# Patient Record
Sex: FEMALE | Race: WHITE | NOT HISPANIC OR LATINO | URBAN - METROPOLITAN AREA
[De-identification: names, ages, dates, MRNs, and addresses within clinical notes are randomized per-mention and may not be internally consistent; named-entity substitution may affect disease eponyms.]

---

## 2020-07-24 ENCOUNTER — INPATIENT (INPATIENT)
Facility: HOSPITAL | Age: 25
LOS: 5 days | Discharge: HOME | DRG: 386 | End: 2020-07-30
Attending: INTERNAL MEDICINE | Admitting: INTERNAL MEDICINE
Payer: COMMERCIAL

## 2020-07-24 VITALS
HEIGHT: 61 IN | HEART RATE: 124 BPM | OXYGEN SATURATION: 99 % | RESPIRATION RATE: 20 BRPM | DIASTOLIC BLOOD PRESSURE: 89 MMHG | TEMPERATURE: 98 F | SYSTOLIC BLOOD PRESSURE: 120 MMHG | WEIGHT: 119.93 LBS

## 2020-07-24 DIAGNOSIS — K51.018 ULCERATIVE (CHRONIC) PANCOLITIS WITH OTHER COMPLICATION: ICD-10-CM

## 2020-07-24 LAB
ALBUMIN SERPL ELPH-MCNC: 3.2 G/DL — LOW (ref 3.3–5)
ALP SERPL-CCNC: 72 U/L — SIGNIFICANT CHANGE UP (ref 40–120)
ALT FLD-CCNC: 14 U/L — SIGNIFICANT CHANGE UP (ref 10–45)
ANION GAP SERPL CALC-SCNC: 18 MMOL/L — HIGH (ref 5–17)
AST SERPL-CCNC: 8 U/L — LOW (ref 10–40)
BASE EXCESS BLDV CALC-SCNC: -5 MMOL/L — LOW (ref -2–2)
BASOPHILS # BLD AUTO: 0.11 K/UL — SIGNIFICANT CHANGE UP (ref 0–0.2)
BASOPHILS NFR BLD AUTO: 1 % — SIGNIFICANT CHANGE UP (ref 0–2)
BILIRUB SERPL-MCNC: 0.2 MG/DL — SIGNIFICANT CHANGE UP (ref 0.2–1.2)
BUN SERPL-MCNC: 4 MG/DL — LOW (ref 7–23)
C DIFF GDH STL QL: NEGATIVE — SIGNIFICANT CHANGE UP
C DIFF GDH STL QL: SIGNIFICANT CHANGE UP
CA-I SERPL-SCNC: 1.14 MMOL/L — SIGNIFICANT CHANGE UP (ref 1.12–1.3)
CALCIUM SERPL-MCNC: 8.7 MG/DL — SIGNIFICANT CHANGE UP (ref 8.4–10.5)
CHLORIDE BLDV-SCNC: 103 MMOL/L — SIGNIFICANT CHANGE UP (ref 96–108)
CHLORIDE SERPL-SCNC: 98 MMOL/L — SIGNIFICANT CHANGE UP (ref 96–108)
CO2 BLDV-SCNC: 21 MMOL/L — LOW (ref 22–30)
CO2 SERPL-SCNC: 17 MMOL/L — LOW (ref 22–31)
CREAT SERPL-MCNC: 0.89 MG/DL — SIGNIFICANT CHANGE UP (ref 0.5–1.3)
CRP SERPL-MCNC: 11.16 MG/DL — HIGH (ref 0–0.4)
EOSINOPHIL # BLD AUTO: 0.11 K/UL — SIGNIFICANT CHANGE UP (ref 0–0.5)
EOSINOPHIL NFR BLD AUTO: 1 % — SIGNIFICANT CHANGE UP (ref 0–6)
GAS PNL BLDV: 135 MMOL/L — SIGNIFICANT CHANGE UP (ref 135–145)
GAS PNL BLDV: SIGNIFICANT CHANGE UP
GLUCOSE BLDV-MCNC: 92 MG/DL — SIGNIFICANT CHANGE UP (ref 70–99)
GLUCOSE SERPL-MCNC: 95 MG/DL — SIGNIFICANT CHANGE UP (ref 70–99)
HCG SERPL-ACNC: <2 MIU/ML — SIGNIFICANT CHANGE UP
HCO3 BLDV-SCNC: 20 MMOL/L — LOW (ref 21–29)
HCT VFR BLD CALC: 34.1 % — LOW (ref 34.5–45)
HCT VFR BLDA CALC: 36 % — LOW (ref 39–50)
HGB BLD CALC-MCNC: 11.6 G/DL — SIGNIFICANT CHANGE UP (ref 11.5–15.5)
HGB BLD-MCNC: 11 G/DL — LOW (ref 11.5–15.5)
LACTATE BLDV-MCNC: 1.5 MMOL/L — SIGNIFICANT CHANGE UP (ref 0.7–2)
LG PLATELETS BLD QL AUTO: SLIGHT — SIGNIFICANT CHANGE UP
LYMPHOCYTES # BLD AUTO: 0.67 K/UL — LOW (ref 1–3.3)
LYMPHOCYTES # BLD AUTO: 6 % — LOW (ref 13–44)
MANUAL SMEAR VERIFICATION: SIGNIFICANT CHANGE UP
MCHC RBC-ENTMCNC: 28.9 PG — SIGNIFICANT CHANGE UP (ref 27–34)
MCHC RBC-ENTMCNC: 32.3 GM/DL — SIGNIFICANT CHANGE UP (ref 32–36)
MCV RBC AUTO: 89.7 FL — SIGNIFICANT CHANGE UP (ref 80–100)
MONOCYTES # BLD AUTO: 1.01 K/UL — HIGH (ref 0–0.9)
MONOCYTES NFR BLD AUTO: 9 % — SIGNIFICANT CHANGE UP (ref 2–14)
NEUTROPHILS # BLD AUTO: 8.54 K/UL — HIGH (ref 1.8–7.4)
NEUTROPHILS NFR BLD AUTO: 59 % — SIGNIFICANT CHANGE UP (ref 43–77)
NEUTS BAND # BLD: 17 % — HIGH (ref 0–8)
NRBC # BLD: 0 /100 — SIGNIFICANT CHANGE UP (ref 0–0)
PCO2 BLDV: 36 MMHG — SIGNIFICANT CHANGE UP (ref 35–50)
PH BLDV: 7.35 — SIGNIFICANT CHANGE UP (ref 7.35–7.45)
PLAT MORPH BLD: ABNORMAL
PLATELET # BLD AUTO: 592 K/UL — HIGH (ref 150–400)
PO2 BLDV: 30 MMHG — SIGNIFICANT CHANGE UP (ref 25–45)
POTASSIUM BLDV-SCNC: 4.8 MMOL/L — SIGNIFICANT CHANGE UP (ref 3.5–5.3)
POTASSIUM SERPL-MCNC: 3.7 MMOL/L — SIGNIFICANT CHANGE UP (ref 3.5–5.3)
POTASSIUM SERPL-SCNC: 3.7 MMOL/L — SIGNIFICANT CHANGE UP (ref 3.5–5.3)
PROT SERPL-MCNC: 6.7 G/DL — SIGNIFICANT CHANGE UP (ref 6–8.3)
RBC # BLD: 3.8 M/UL — SIGNIFICANT CHANGE UP (ref 3.8–5.2)
RBC # FLD: 12.5 % — SIGNIFICANT CHANGE UP (ref 10.3–14.5)
RBC BLD AUTO: SIGNIFICANT CHANGE UP
SAO2 % BLDV: 46 % — LOW (ref 67–88)
SARS-COV-2 RNA SPEC QL NAA+PROBE: SIGNIFICANT CHANGE UP
SODIUM SERPL-SCNC: 133 MMOL/L — LOW (ref 135–145)
VARIANT LYMPHS # BLD: 7 % — HIGH (ref 0–6)
WBC # BLD: 11.24 K/UL — HIGH (ref 3.8–10.5)
WBC # FLD AUTO: 11.24 K/UL — HIGH (ref 3.8–10.5)

## 2020-07-24 PROCEDURE — 99285 EMERGENCY DEPT VISIT HI MDM: CPT

## 2020-07-24 PROCEDURE — 93010 ELECTROCARDIOGRAM REPORT: CPT

## 2020-07-24 RX ORDER — MORPHINE SULFATE 50 MG/1
2 CAPSULE, EXTENDED RELEASE ORAL ONCE
Refills: 0 | Status: DISCONTINUED | OUTPATIENT
Start: 2020-07-24 | End: 2020-07-24

## 2020-07-24 RX ORDER — SODIUM CHLORIDE 9 MG/ML
1000 INJECTION INTRAMUSCULAR; INTRAVENOUS; SUBCUTANEOUS
Refills: 0 | Status: DISCONTINUED | OUTPATIENT
Start: 2020-07-24 | End: 2020-07-30

## 2020-07-24 RX ORDER — SODIUM CHLORIDE 9 MG/ML
1000 INJECTION, SOLUTION INTRAVENOUS ONCE
Refills: 0 | Status: COMPLETED | OUTPATIENT
Start: 2020-07-24 | End: 2020-07-24

## 2020-07-24 RX ORDER — METRONIDAZOLE 500 MG
TABLET ORAL
Refills: 0 | Status: DISCONTINUED | OUTPATIENT
Start: 2020-07-24 | End: 2020-07-30

## 2020-07-24 RX ORDER — ONDANSETRON 8 MG/1
4 TABLET, FILM COATED ORAL ONCE
Refills: 0 | Status: COMPLETED | OUTPATIENT
Start: 2020-07-24 | End: 2020-07-24

## 2020-07-24 RX ORDER — CIPROFLOXACIN LACTATE 400MG/40ML
400 VIAL (ML) INTRAVENOUS EVERY 12 HOURS
Refills: 0 | Status: DISCONTINUED | OUTPATIENT
Start: 2020-07-24 | End: 2020-07-30

## 2020-07-24 RX ORDER — MORPHINE SULFATE 50 MG/1
2 CAPSULE, EXTENDED RELEASE ORAL EVERY 4 HOURS
Refills: 0 | Status: DISCONTINUED | OUTPATIENT
Start: 2020-07-24 | End: 2020-07-30

## 2020-07-24 RX ORDER — METRONIDAZOLE 500 MG
500 TABLET ORAL EVERY 8 HOURS
Refills: 0 | Status: DISCONTINUED | OUTPATIENT
Start: 2020-07-25 | End: 2020-07-30

## 2020-07-24 RX ORDER — METRONIDAZOLE 500 MG
500 TABLET ORAL ONCE
Refills: 0 | Status: COMPLETED | OUTPATIENT
Start: 2020-07-24 | End: 2020-07-24

## 2020-07-24 RX ADMIN — Medication 40 MILLIGRAM(S): at 21:32

## 2020-07-24 RX ADMIN — MORPHINE SULFATE 2 MILLIGRAM(S): 50 CAPSULE, EXTENDED RELEASE ORAL at 17:17

## 2020-07-24 RX ADMIN — ONDANSETRON 4 MILLIGRAM(S): 8 TABLET, FILM COATED ORAL at 23:26

## 2020-07-24 RX ADMIN — Medication 40 MILLIGRAM(S): at 17:17

## 2020-07-24 RX ADMIN — SODIUM CHLORIDE 4000 MILLILITER(S): 9 INJECTION, SOLUTION INTRAVENOUS at 17:11

## 2020-07-24 RX ADMIN — MORPHINE SULFATE 2 MILLIGRAM(S): 50 CAPSULE, EXTENDED RELEASE ORAL at 21:32

## 2020-07-24 RX ADMIN — Medication 200 MILLIGRAM(S): at 19:51

## 2020-07-24 RX ADMIN — ONDANSETRON 4 MILLIGRAM(S): 8 TABLET, FILM COATED ORAL at 17:17

## 2020-07-24 RX ADMIN — Medication 100 MILLIGRAM(S): at 18:50

## 2020-07-24 RX ADMIN — SODIUM CHLORIDE 1000 MILLILITER(S): 9 INJECTION, SOLUTION INTRAVENOUS at 17:17

## 2020-07-24 RX ADMIN — SODIUM CHLORIDE 75 MILLILITER(S): 9 INJECTION INTRAMUSCULAR; INTRAVENOUS; SUBCUTANEOUS at 21:39

## 2020-07-24 RX ADMIN — MORPHINE SULFATE 2 MILLIGRAM(S): 50 CAPSULE, EXTENDED RELEASE ORAL at 23:01

## 2020-07-24 NOTE — ED PROVIDER NOTE - ATTENDING CONTRIBUTION TO CARE
------------ATTENDING NOTE------------   pt c/o weeks of intermittent moderate/severe abdominal crampy pain, loose bloody stools, colonoscopy today w/ signs UC/IBD, significant dehydration on ED arrival, awaiting labs/response to fluids, no fevers, awaiting GI consult for admission -->  - Alfredo Madrigal MD   ------------------------------------------------ ------------ATTENDING NOTE------------   pt c/o weeks of intermittent moderate/severe abdominal crampy pain, loose bloody stools, colonoscopy today w/ signs UC/IBD, significant dehydration on ED arrival, awaiting labs/response to fluids, no fevers, awaiting GI consult for admission --> improving w/ fluids/meds, empiric abx, eval by GI, admitting for IVF / advance diet, control IBD.  - Alfredo Madrigal MD   ------------------------------------------------

## 2020-07-24 NOTE — CONSULT NOTE ADULT - ASSESSMENT
one month of colitis, ct at [facility] with colitis, (blood and diarrhea) pt with colon today with severe colitis (has report) likely uc.  agree with steorids and abx.  no travel or sick contacts.  has never been to [facility].

## 2020-07-24 NOTE — ED PROVIDER NOTE - CARE PLAN
Principal Discharge DX:	Ulcerative pancolitis with other complication  Secondary Diagnosis:	Dehydration, moderate

## 2020-07-24 NOTE — H&P ADULT - ASSESSMENT
25 yo female with no PMHx p/w diarrhea, abd pain.  Patient reports that she developed fevers and diarrhea 4 months ago.  fevers resolved, but diarrhea has persisted.  A few weeks ago diarrhea became associated with severe abdominal pain.  Went to an OSH and had a CT showing pancolitis.  Patient followed up with GI and had a colonoscopy today.  Colonoscopy showing severe inflammation concerning for UC.  Sent to the ER from GI for IV steroids and further work up.  Denies fevers, CP, SOB, vomiting.    colitis  - seen by GI  - iv steroids  - iv abx  - stool studies  - clears

## 2020-07-24 NOTE — ED PROVIDER NOTE - PHYSICAL EXAMINATION
Gen: AAO x 3, NAD  Skin: No rashes or lesions  HEENT: NC/AT, PERRLA, EOMI, MMM  Resp: unlabored CTAB  Cardiac: tachycardic s1s2, no murmurs, rubs or gallops  GI: ND, +BS, Soft, diffusely ttp worse in the Right and left lower quadrants  Ext: no pedal edema, FROM in all extremities  Neuro: no focal deficits

## 2020-07-24 NOTE — CONSULT NOTE ADULT - SUBJECTIVE AND OBJECTIVE BOX
Patient is a 24y Female     Patient is a 24y old  Female who presents with a chief complaint of     HPI:      PAST MEDICAL & SURGICAL HISTORY:      MEDICATIONS  (STANDING):  ciprofloxacin   IVPB 400 milliGRAM(s) IV Intermittent every 12 hours  metroNIDAZOLE  IVPB          Allergies    No Known Allergies    Intolerances        SOCIAL HISTORY:  Denies ETOh,Smoking,     FAMILY HISTORY:      REVIEW OF SYSTEMS:    CONSTITUTIONAL: No weakness, fevers or chills  EYES/ENT: No visual changes;  No vertigo or throat pain   NECK: No pain or stiffness  RESPIRATORY: No cough, wheezing, hemoptysis; No shortness of breath  CARDIOVASCULAR: No chest pain or palpitations  GASTROINTESTINAL: No abdominal or epigastric pain. No nausea, vomiting, or hematemesis; No diarrhea or constipation. No melena or hematochezia.  GENITOURINARY: No dysuria, frequency or hematuria  NEUROLOGICAL: No numbness or weakness  SKIN: No itching, burning, rashes, or lesions   All other review of systems is negative unless indicated above.    VITAL:  T(C): , Max: 36.7 (07-24-20 @ 15:51)  T(F): , Max: 98.1 (07-24-20 @ 15:51)  HR: 124 (07-24-20 @ 15:51)  BP: 120/89 (07-24-20 @ 15:51)  BP(mean): --  RR: 20 (07-24-20 @ 15:51)  SpO2: 99% (07-24-20 @ 15:51)  Wt(kg): --    I and O's:    Height (cm): 154.94 (07-24 @ 15:51)  Weight (kg): 54.4 (07-24 @ 15:51)  BMI (kg/m2): 22.7 (07-24 @ 15:51)  BSA (m2): 1.52 (07-24 @ 15:51)    PHYSICAL EXAM:    Constitutional: NAD  HEENT: PERRLA,   Neck: No JVD  Respiratory: CTA B/L  Cardiovascular: S1 and S2  Gastrointestinal: BS+, soft, NT/ND  Extremities: No peripheral edema  Neurological: A/O x 3, no focal deficits  Psychiatric: Normal mood, normal affect  : No Pena  Skin: No rashes  Access: Not applicable  Back: No CVA tenderness    LABS:                        11.0   11.24 )-----------( 592      ( 24 Jul 2020 17:28 )             34.1     07-24    133<L>  |  98  |  4<L>  ----------------------------<  95  3.7   |  17<L>  |  0.89    Ca    8.7      24 Jul 2020 17:28  Mg     2.0     07-24    TPro  6.7  /  Alb  3.2<L>  /  TBili  0.2  /  DBili  x   /  AST  8<L>  /  ALT  14  /  AlkPhos  72  07-24          RADIOLOGY & ADDITIONAL STUDIES:

## 2020-07-24 NOTE — ED PROVIDER NOTE - OBJECTIVE STATEMENT
23 yo female with no PMHx p/w diarrhea, abd pain.  Patient reports that she developed fevers and diarrhea 4 months ago.  fevers resolved, but diarrhea has persisted.  A few weeks ago diarrhea became associated with severe abdominal pain.  Went to an OSH and had a CT showing pancolitis.  Patient followed up with GI and had a colonoscopy today.  Colonoscopy showing severe inflammation concerning for UC.  Sent to the ER from GI for IV steroids and further work up.  Denies fevers, CP, SOB, vomiting.   GI: Dr. Tanesha Avitia

## 2020-07-24 NOTE — ED ADULT NURSE REASSESSMENT NOTE - NS ED NURSE REASSESS COMMENT FT1
Received report @ 1900. Pt lying in bed comfortably but c/o of slight abdominal pain, MD made aware. Pt given cipro as per MD order. Pt admitted to hospital and awaiting for transport. VS stable, pt in no acute distress

## 2020-07-24 NOTE — CHART NOTE - NSCHARTNOTEFT_GEN_A_CORE
Notified by Rn pt c/o of abdominal pain, VSS. Pt admitted for colitis concerning for UC. Discussed with Dr. Ayala about pain regimen, will start pt on morphine 2mg q4hrs PRN for severe pain. Will continue to monitor overnight.

## 2020-07-24 NOTE — H&P ADULT - NSHPLABSRESULTS_GEN_ALL_CORE
LABS:                        11.0   11.24 )-----------( 592      ( 24 Jul 2020 17:28 )             34.1     07-24    133<L>  |  98  |  4<L>  ----------------------------<  95  3.7   |  17<L>  |  0.89    Ca    8.7      24 Jul 2020 17:28  Mg     2.0     07-24    TPro  6.7  /  Alb  3.2<L>  /  TBili  0.2  /  DBili  x   /  AST  8<L>  /  ALT  14  /  AlkPhos  72  07-24              RADIOLOGY & ADDITIONAL TESTS:

## 2020-07-24 NOTE — ED ADULT NURSE NOTE - OBJECTIVE STATEMENT
pt pw c/o abd pain assoc with diarrhea with bright red blood and dark red clots onset beginning of April, went to urgent care in June and was Dx with diverticulitis was sent home on abx states took for a week with no relief, so went to [facility]  last weekend and was Dx there with pancolitis and was sent home with outpatient GI follow up pt states she found a specialist who performed colonoscopy today and was told to come straight to the ED due the results of colonoscopy and pt still remains in pain for immediate treatment. had one episode of vomiting on Tuesday, denies any N/V today, denies fevers but states she has had intermittent chills and sweats.

## 2020-07-24 NOTE — H&P ADULT - HISTORY OF PRESENT ILLNESS
23 yo female with no PMHx p/w diarrhea, abd pain.  Patient reports that she developed fevers and diarrhea 4 months ago.  fevers resolved, but diarrhea has persisted.  A few weeks ago diarrhea became associated with severe abdominal pain.  Went to an OSH and had a CT showing pancolitis.  Patient followed up with GI and had a colonoscopy today.  Colonoscopy showing severe inflammation concerning for UC.  Sent to the ER from GI for IV steroids and further work up.  Denies fevers, CP, SOB, vomiting.

## 2020-07-25 LAB
ANION GAP SERPL CALC-SCNC: 13 MMOL/L — SIGNIFICANT CHANGE UP (ref 5–17)
BUN SERPL-MCNC: 5 MG/DL — LOW (ref 7–23)
CALCIUM SERPL-MCNC: 9 MG/DL — SIGNIFICANT CHANGE UP (ref 8.4–10.5)
CHLORIDE SERPL-SCNC: 100 MMOL/L — SIGNIFICANT CHANGE UP (ref 96–108)
CO2 SERPL-SCNC: 22 MMOL/L — SIGNIFICANT CHANGE UP (ref 22–31)
CREAT SERPL-MCNC: 0.73 MG/DL — SIGNIFICANT CHANGE UP (ref 0.5–1.3)
CULTURE RESULTS: SIGNIFICANT CHANGE UP
ERYTHROCYTE [SEDIMENTATION RATE] IN BLOOD: 98 MM/HR — HIGH (ref 0–15)
FOLATE SERPL-MCNC: 11.5 NG/ML — SIGNIFICANT CHANGE UP
GLUCOSE SERPL-MCNC: 180 MG/DL — HIGH (ref 70–99)
HCT VFR BLD CALC: 31.4 % — LOW (ref 34.5–45)
HGB BLD-MCNC: 10.2 G/DL — LOW (ref 11.5–15.5)
MAGNESIUM SERPL-MCNC: 2.1 MG/DL — SIGNIFICANT CHANGE UP (ref 1.6–2.6)
MCHC RBC-ENTMCNC: 29.3 PG — SIGNIFICANT CHANGE UP (ref 27–34)
MCHC RBC-ENTMCNC: 32.5 GM/DL — SIGNIFICANT CHANGE UP (ref 32–36)
MCV RBC AUTO: 90.2 FL — SIGNIFICANT CHANGE UP (ref 80–100)
NRBC # BLD: 0 /100 WBCS — SIGNIFICANT CHANGE UP (ref 0–0)
PHOSPHATE SERPL-MCNC: 3.6 MG/DL — SIGNIFICANT CHANGE UP (ref 2.5–4.5)
PLATELET # BLD AUTO: 567 K/UL — HIGH (ref 150–400)
POTASSIUM SERPL-MCNC: 4.5 MMOL/L — SIGNIFICANT CHANGE UP (ref 3.5–5.3)
POTASSIUM SERPL-SCNC: 4.5 MMOL/L — SIGNIFICANT CHANGE UP (ref 3.5–5.3)
RBC # BLD: 3.48 M/UL — LOW (ref 3.8–5.2)
RBC # FLD: 12.7 % — SIGNIFICANT CHANGE UP (ref 10.3–14.5)
SODIUM SERPL-SCNC: 135 MMOL/L — SIGNIFICANT CHANGE UP (ref 135–145)
SPECIMEN SOURCE: SIGNIFICANT CHANGE UP
TSH SERPL-MCNC: 1.08 UIU/ML — SIGNIFICANT CHANGE UP (ref 0.27–4.2)
TSH SERPL-MCNC: 2.24 UIU/ML — SIGNIFICANT CHANGE UP (ref 0.27–4.2)
VIT B12 SERPL-MCNC: >2000 PG/ML — HIGH (ref 232–1245)
WBC # BLD: 6.9 K/UL — SIGNIFICANT CHANGE UP (ref 3.8–10.5)
WBC # FLD AUTO: 6.9 K/UL — SIGNIFICANT CHANGE UP (ref 3.8–10.5)

## 2020-07-25 RX ADMIN — MORPHINE SULFATE 2 MILLIGRAM(S): 50 CAPSULE, EXTENDED RELEASE ORAL at 21:00

## 2020-07-25 RX ADMIN — MORPHINE SULFATE 2 MILLIGRAM(S): 50 CAPSULE, EXTENDED RELEASE ORAL at 14:56

## 2020-07-25 RX ADMIN — MORPHINE SULFATE 2 MILLIGRAM(S): 50 CAPSULE, EXTENDED RELEASE ORAL at 03:16

## 2020-07-25 RX ADMIN — Medication 100 MILLIGRAM(S): at 07:26

## 2020-07-25 RX ADMIN — Medication 40 MILLIGRAM(S): at 13:07

## 2020-07-25 RX ADMIN — MORPHINE SULFATE 2 MILLIGRAM(S): 50 CAPSULE, EXTENDED RELEASE ORAL at 07:39

## 2020-07-25 RX ADMIN — MORPHINE SULFATE 2 MILLIGRAM(S): 50 CAPSULE, EXTENDED RELEASE ORAL at 03:45

## 2020-07-25 RX ADMIN — Medication 200 MILLIGRAM(S): at 05:03

## 2020-07-25 RX ADMIN — Medication 100 MILLIGRAM(S): at 13:05

## 2020-07-25 RX ADMIN — Medication 100 MILLIGRAM(S): at 22:36

## 2020-07-25 RX ADMIN — MORPHINE SULFATE 2 MILLIGRAM(S): 50 CAPSULE, EXTENDED RELEASE ORAL at 19:59

## 2020-07-25 RX ADMIN — MORPHINE SULFATE 2 MILLIGRAM(S): 50 CAPSULE, EXTENDED RELEASE ORAL at 10:01

## 2020-07-25 RX ADMIN — MORPHINE SULFATE 2 MILLIGRAM(S): 50 CAPSULE, EXTENDED RELEASE ORAL at 14:23

## 2020-07-25 RX ADMIN — Medication 40 MILLIGRAM(S): at 22:35

## 2020-07-25 RX ADMIN — Medication 40 MILLIGRAM(S): at 05:02

## 2020-07-25 RX ADMIN — Medication 200 MILLIGRAM(S): at 17:11

## 2020-07-25 NOTE — PROGRESS NOTE ADULT - SUBJECTIVE AND OBJECTIVE BOX
Patient is a 25y old  Female who presents with a chief complaint of colitis (25 Jul 2020 11:19)      SUBJECTIVE / OVERNIGHT EVENTS:  having diarrhea with blood clots    MEDICATIONS  (STANDING):  ciprofloxacin   IVPB 400 milliGRAM(s) IV Intermittent every 12 hours  methylPREDNISolone sodium succinate Injectable 40 milliGRAM(s) IV Push every 8 hours  metroNIDAZOLE  IVPB      metroNIDAZOLE  IVPB 500 milliGRAM(s) IV Intermittent every 8 hours  sodium chloride 0.9%. 1000 milliLiter(s) (75 mL/Hr) IV Continuous <Continuous>    MEDICATIONS  (PRN):  morphine  - Injectable 2 milliGRAM(s) IV Push every 4 hours PRN Severe Pain (7 - 10)      Vital Signs Last 24 Hrs  T(C): 36.8 (25 Jul 2020 12:16), Max: 37.2 (24 Jul 2020 19:55)  T(F): 98.3 (25 Jul 2020 12:16), Max: 98.9 (24 Jul 2020 19:55)  HR: 88 (25 Jul 2020 12:16) (65 - 124)  BP: 98/64 (25 Jul 2020 12:16) (97/61 - 120/89)  BP(mean): 73 (25 Jul 2020 04:52) (73 - 73)  RR: 18 (25 Jul 2020 12:16) (18 - 20)  SpO2: 100% (25 Jul 2020 12:16) (97% - 100%)  CAPILLARY BLOOD GLUCOSE        I&O's Summary    24 Jul 2020 07:01  -  25 Jul 2020 07:00  --------------------------------------------------------  IN: 300 mL / OUT: 1 mL / NET: 299 mL        PHYSICAL EXAM:  GENERAL: NAD, well-developed  HEAD:  Atraumatic, Normocephalic  EYES: EOMI, PERRLA, conjunctiva and sclera clear  NECK: Supple, No JVD  CHEST/LUNG: Clear to auscultation bilaterally; No wheeze  HEART: Regular rate and rhythm; No murmurs, rubs, or gallops  ABDOMEN: Soft, tender, Nondistended; Bowel sounds present  EXTREMITIES:  2+ Peripheral Pulses, No clubbing, cyanosis, or edema  PSYCH: AAOx3  NEUROLOGY: non-focal  SKIN: No rashes or lesions    LABS:                        10.2   6.90  )-----------( 567      ( 25 Jul 2020 07:14 )             31.4     07-25    135  |  100  |  5<L>  ----------------------------<  180<H>  4.5   |  22  |  0.73    Ca    9.0      25 Jul 2020 07:13  Phos  3.6     07-25  Mg     2.1     07-25    TPro  6.7  /  Alb  3.2<L>  /  TBili  0.2  /  DBili  x   /  AST  8<L>  /  ALT  14  /  AlkPhos  72  07-24              RADIOLOGY & ADDITIONAL TESTS:    Imaging Personally Reviewed:    Consultant(s) Notes Reviewed:      Care Discussed with Consultants/Other Providers:

## 2020-07-25 NOTE — PROGRESS NOTE ADULT - SUBJECTIVE AND OBJECTIVE BOX
BARRY MCKEON:[id number],   25yFemale followed for:  No Known Allergies    PAST MEDICAL & SURGICAL HISTORY:    FAMILY HISTORY:    MEDICATIONS  (STANDING):  ciprofloxacin   IVPB 400 milliGRAM(s) IV Intermittent every 12 hours  methylPREDNISolone sodium succinate Injectable 40 milliGRAM(s) IV Push every 8 hours  metroNIDAZOLE  IVPB      metroNIDAZOLE  IVPB 500 milliGRAM(s) IV Intermittent every 8 hours  sodium chloride 0.9%. 1000 milliLiter(s) (75 mL/Hr) IV Continuous <Continuous>    MEDICATIONS  (PRN):  morphine  - Injectable 2 milliGRAM(s) IV Push every 4 hours PRN Severe Pain (7 - 10)      Vital Signs Last 24 Hrs  T(C): 36.8 (25 Jul 2020 04:52), Max: 37.2 (24 Jul 2020 19:55)  T(F): 98.3 (25 Jul 2020 04:52), Max: 98.9 (24 Jul 2020 19:55)  HR: 65 (25 Jul 2020 04:52) (65 - 124)  BP: 97/61 (25 Jul 2020 04:52) (97/61 - 120/89)  BP(mean): 73 (25 Jul 2020 04:52) (73 - 73)  RR: 18 (25 Jul 2020 04:52) (18 - 20)  SpO2: 97% (25 Jul 2020 04:52) (97% - 100%)  nc/at  s1s2  cta  soft, nt, nd no guarding or rebound  no c/c/e    CBC Full  -  ( 25 Jul 2020 07:14 )  WBC Count : 6.90 K/uL  RBC Count : 3.48 M/uL  Hemoglobin : 10.2 g/dL  Hematocrit : 31.4 %  Platelet Count - Automated : 567 K/uL  Mean Cell Volume : 90.2 fl  Mean Cell Hemoglobin : 29.3 pg  Mean Cell Hemoglobin Concentration : 32.5 gm/dL  Auto Neutrophil # : x  Auto Lymphocyte # : x  Auto Monocyte # : x  Auto Eosinophil # : x  Auto Basophil # : x  Auto Neutrophil % : x  Auto Lymphocyte % : x  Auto Monocyte % : x  Auto Eosinophil % : x  Auto Basophil % : x    07-25    135  |  100  |  5<L>  ----------------------------<  180<H>  4.5   |  22  |  0.73    Ca    9.0      25 Jul 2020 07:13  Phos  3.6     07-25  Mg     2.1     07-25    TPro  6.7  /  Alb  3.2<L>  /  TBili  0.2  /  DBili  x   /  AST  8<L>  /  ALT  14  /  AlkPhos  72  07-24

## 2020-07-26 LAB
ANION GAP SERPL CALC-SCNC: 11 MMOL/L — SIGNIFICANT CHANGE UP (ref 5–17)
BUN SERPL-MCNC: 10 MG/DL — SIGNIFICANT CHANGE UP (ref 7–23)
CALCIUM SERPL-MCNC: 8.5 MG/DL — SIGNIFICANT CHANGE UP (ref 8.4–10.5)
CHLORIDE SERPL-SCNC: 108 MMOL/L — SIGNIFICANT CHANGE UP (ref 96–108)
CO2 SERPL-SCNC: 21 MMOL/L — LOW (ref 22–31)
CREAT SERPL-MCNC: 0.69 MG/DL — SIGNIFICANT CHANGE UP (ref 0.5–1.3)
GLUCOSE SERPL-MCNC: 165 MG/DL — HIGH (ref 70–99)
HCT VFR BLD CALC: 33 % — LOW (ref 34.5–45)
HGB BLD-MCNC: 10.5 G/DL — LOW (ref 11.5–15.5)
MCHC RBC-ENTMCNC: 28.8 PG — SIGNIFICANT CHANGE UP (ref 27–34)
MCHC RBC-ENTMCNC: 31.8 GM/DL — LOW (ref 32–36)
MCV RBC AUTO: 90.7 FL — SIGNIFICANT CHANGE UP (ref 80–100)
NRBC # BLD: 0 /100 WBCS — SIGNIFICANT CHANGE UP (ref 0–0)
PLATELET # BLD AUTO: 623 K/UL — HIGH (ref 150–400)
POTASSIUM SERPL-MCNC: 4.1 MMOL/L — SIGNIFICANT CHANGE UP (ref 3.5–5.3)
POTASSIUM SERPL-SCNC: 4.1 MMOL/L — SIGNIFICANT CHANGE UP (ref 3.5–5.3)
RBC # BLD: 3.64 M/UL — LOW (ref 3.8–5.2)
RBC # FLD: 12.6 % — SIGNIFICANT CHANGE UP (ref 10.3–14.5)
SARS-COV-2 IGG SERPL QL IA: NEGATIVE — SIGNIFICANT CHANGE UP
SARS-COV-2 IGM SERPL IA-ACNC: <3.8 AU/ML — SIGNIFICANT CHANGE UP
SODIUM SERPL-SCNC: 140 MMOL/L — SIGNIFICANT CHANGE UP (ref 135–145)
WBC # BLD: 10.61 K/UL — HIGH (ref 3.8–10.5)
WBC # FLD AUTO: 10.61 K/UL — HIGH (ref 3.8–10.5)

## 2020-07-26 RX ORDER — ACETAMINOPHEN 500 MG
1000 TABLET ORAL ONCE
Refills: 0 | Status: COMPLETED | OUTPATIENT
Start: 2020-07-26 | End: 2020-07-26

## 2020-07-26 RX ORDER — ONDANSETRON 8 MG/1
4 TABLET, FILM COATED ORAL ONCE
Refills: 0 | Status: COMPLETED | OUTPATIENT
Start: 2020-07-26 | End: 2020-07-26

## 2020-07-26 RX ADMIN — ONDANSETRON 4 MILLIGRAM(S): 8 TABLET, FILM COATED ORAL at 04:45

## 2020-07-26 RX ADMIN — ONDANSETRON 4 MILLIGRAM(S): 8 TABLET, FILM COATED ORAL at 02:00

## 2020-07-26 RX ADMIN — Medication 40 MILLIGRAM(S): at 05:06

## 2020-07-26 RX ADMIN — SODIUM CHLORIDE 75 MILLILITER(S): 9 INJECTION INTRAMUSCULAR; INTRAVENOUS; SUBCUTANEOUS at 04:46

## 2020-07-26 RX ADMIN — SODIUM CHLORIDE 75 MILLILITER(S): 9 INJECTION INTRAMUSCULAR; INTRAVENOUS; SUBCUTANEOUS at 20:15

## 2020-07-26 RX ADMIN — Medication 40 MILLIGRAM(S): at 13:02

## 2020-07-26 RX ADMIN — ONDANSETRON 4 MILLIGRAM(S): 8 TABLET, FILM COATED ORAL at 13:02

## 2020-07-26 RX ADMIN — Medication 200 MILLIGRAM(S): at 05:06

## 2020-07-26 RX ADMIN — Medication 100 MILLIGRAM(S): at 06:42

## 2020-07-26 RX ADMIN — MORPHINE SULFATE 2 MILLIGRAM(S): 50 CAPSULE, EXTENDED RELEASE ORAL at 04:28

## 2020-07-26 RX ADMIN — MORPHINE SULFATE 2 MILLIGRAM(S): 50 CAPSULE, EXTENDED RELEASE ORAL at 21:58

## 2020-07-26 RX ADMIN — Medication 40 MILLIGRAM(S): at 20:18

## 2020-07-26 RX ADMIN — Medication 200 MILLIGRAM(S): at 17:28

## 2020-07-26 RX ADMIN — MORPHINE SULFATE 2 MILLIGRAM(S): 50 CAPSULE, EXTENDED RELEASE ORAL at 20:00

## 2020-07-26 RX ADMIN — Medication 1000 MILLIGRAM(S): at 23:33

## 2020-07-26 RX ADMIN — Medication 400 MILLIGRAM(S): at 08:44

## 2020-07-26 RX ADMIN — MORPHINE SULFATE 2 MILLIGRAM(S): 50 CAPSULE, EXTENDED RELEASE ORAL at 05:01

## 2020-07-26 RX ADMIN — Medication 400 MILLIGRAM(S): at 22:45

## 2020-07-26 RX ADMIN — Medication 1000 MILLIGRAM(S): at 09:53

## 2020-07-26 NOTE — PROGRESS NOTE ADULT - SUBJECTIVE AND OBJECTIVE BOX
BARRY MCKEON:[id number],   25yFemale followed for:  No Known Allergies    PAST MEDICAL & SURGICAL HISTORY:    FAMILY HISTORY:    MEDICATIONS  (STANDING):  ciprofloxacin   IVPB 400 milliGRAM(s) IV Intermittent every 12 hours  methylPREDNISolone sodium succinate Injectable 40 milliGRAM(s) IV Push every 8 hours  metroNIDAZOLE  IVPB      metroNIDAZOLE  IVPB 500 milliGRAM(s) IV Intermittent every 8 hours  sodium chloride 0.9%. 1000 milliLiter(s) (75 mL/Hr) IV Continuous <Continuous>    MEDICATIONS  (PRN):  morphine  - Injectable 2 milliGRAM(s) IV Push every 4 hours PRN Severe Pain (7 - 10)      Vital Signs Last 24 Hrs  T(C): 36.5 (26 Jul 2020 04:36), Max: 36.8 (25 Jul 2020 12:16)  T(F): 97.7 (26 Jul 2020 04:36), Max: 98.3 (25 Jul 2020 12:16)  HR: 71 (26 Jul 2020 04:36) (71 - 88)  BP: 105/74 (26 Jul 2020 04:36) (98/64 - 105/74)  BP(mean): --  RR: 18 (26 Jul 2020 04:36) (18 - 18)  SpO2: 93% (26 Jul 2020 04:36) (93% - 100%)  nc/at  s1s2  cta  soft, nt, nd no guarding or rebound  no c/c/e    CBC Full  -  ( 26 Jul 2020 06:44 )  WBC Count : 10.61 K/uL  RBC Count : 3.64 M/uL  Hemoglobin : 10.5 g/dL  Hematocrit : 33.0 %  Platelet Count - Automated : 623 K/uL  Mean Cell Volume : 90.7 fl  Mean Cell Hemoglobin : 28.8 pg  Mean Cell Hemoglobin Concentration : 31.8 gm/dL  Auto Neutrophil # : x  Auto Lymphocyte # : x  Auto Monocyte # : x  Auto Eosinophil # : x  Auto Basophil # : x  Auto Neutrophil % : x  Auto Lymphocyte % : x  Auto Monocyte % : x  Auto Eosinophil % : x  Auto Basophil % : x    07-26    140  |  108  |  10  ----------------------------<  165<H>  4.1   |  21<L>  |  0.69    Ca    8.5      26 Jul 2020 06:44  Phos  3.6     07-25  Mg     2.1     07-25    TPro  6.7  /  Alb  3.2<L>  /  TBili  0.2  /  DBili  x   /  AST  8<L>  /  ALT  14  /  AlkPhos  72  07-24

## 2020-07-26 NOTE — PROGRESS NOTE ADULT - SUBJECTIVE AND OBJECTIVE BOX
Patient is a 25y old  Female who presents with a chief complaint of colitis (26 Jul 2020 09:42)      SUBJECTIVE / OVERNIGHT EVENTS:  has had multiple BM's with blood.  feels nausea with flagyl    MEDICATIONS  (STANDING):  ciprofloxacin   IVPB 400 milliGRAM(s) IV Intermittent every 12 hours  methylPREDNISolone sodium succinate Injectable 40 milliGRAM(s) IV Push every 8 hours  metroNIDAZOLE  IVPB      metroNIDAZOLE  IVPB 500 milliGRAM(s) IV Intermittent every 8 hours  sodium chloride 0.9%. 1000 milliLiter(s) (75 mL/Hr) IV Continuous <Continuous>    MEDICATIONS  (PRN):  morphine  - Injectable 2 milliGRAM(s) IV Push every 4 hours PRN Severe Pain (7 - 10)      Vital Signs Last 24 Hrs  T(C): 36.4 (26 Jul 2020 11:36), Max: 36.8 (25 Jul 2020 22:34)  T(F): 97.5 (26 Jul 2020 11:36), Max: 98.2 (25 Jul 2020 22:34)  HR: 67 (26 Jul 2020 11:36) (67 - 78)  BP: 100/65 (26 Jul 2020 11:36) (99/65 - 105/74)  BP(mean): --  RR: 18 (26 Jul 2020 11:36) (18 - 18)  SpO2: 99% (26 Jul 2020 11:36) (93% - 99%)  CAPILLARY BLOOD GLUCOSE        I&O's Summary    25 Jul 2020 07:01  -  26 Jul 2020 07:00  --------------------------------------------------------  IN: 2335 mL / OUT: 0 mL / NET: 2335 mL    26 Jul 2020 07:01  -  26 Jul 2020 17:26  --------------------------------------------------------  IN: 480 mL / OUT: 0 mL / NET: 480 mL        PHYSICAL EXAM:  GENERAL: NAD, well-developed  HEAD:  Atraumatic, Normocephalic  EYES: EOMI, PERRLA, conjunctiva and sclera clear  NECK: Supple, No JVD  CHEST/LUNG: Clear to auscultation bilaterally; No wheeze  HEART: Regular rate and rhythm; No murmurs, rubs, or gallops  ABDOMEN: Soft, Nontender, Nondistended; Bowel sounds present  EXTREMITIES:  2+ Peripheral Pulses, No clubbing, cyanosis, or edema  PSYCH: AAOx3  NEUROLOGY: non-focal  SKIN: No rashes or lesions    LABS:                        10.5   10.61 )-----------( 623      ( 26 Jul 2020 06:44 )             33.0     07-26    140  |  108  |  10  ----------------------------<  165<H>  4.1   |  21<L>  |  0.69    Ca    8.5      26 Jul 2020 06:44  Phos  3.6     07-25  Mg     2.1     07-25    TPro  6.7  /  Alb  3.2<L>  /  TBili  0.2  /  DBili  x   /  AST  8<L>  /  ALT  14  /  AlkPhos  72  07-24              RADIOLOGY & ADDITIONAL TESTS:    Imaging Personally Reviewed:    Consultant(s) Notes Reviewed:      Care Discussed with Consultants/Other Providers:

## 2020-07-26 NOTE — PROVIDER CONTACT NOTE (OTHER) - REASON
pt still complaining of abdominal pain and rates it 5/10; states iv tylenol helped her earlier in the day

## 2020-07-26 NOTE — PROVIDER CONTACT NOTE (OTHER) - REASON
pt refusing to get IV flagyl and states she believes she has an allergic reaction to it, causing nausea/vomiting

## 2020-07-26 NOTE — PROVIDER CONTACT NOTE (OTHER) - ASSESSMENT
pt A&OX4 in no distress, resting in bed
VSS, patient c/o of LL ABD Quad Pain and RL Abd quad pain, states pain is sharp. pt states "pain is not like to typical abdominal pain I have been having" denies any feeling related to menstrual cramps last dose of morphine [id number]

## 2020-07-26 NOTE — PROGRESS NOTE ADULT - ASSESSMENT
25 yo female with no PMHx p/w diarrhea, abd pain.  Patient reports that she developed fevers and diarrhea 4 months ago.  fevers resolved, but diarrhea has persisted.  A few weeks ago diarrhea became associated with severe abdominal pain.  Went to an OSH and had a CT showing pancolitis.  Patient followed up with GI and had a colonoscopy today.  Colonoscopy showing severe inflammation concerning for UC.  Sent to the ER from GI for IV steroids and further work up.  Denies fevers, CP, SOB, vomiting.    colitis likely ulcerative colitis  - GI following  - iv steroids  - iv abx  - stool studies negative  - clears for now  - ambulation encouraged

## 2020-07-26 NOTE — PROVIDER CONTACT NOTE (OTHER) - BACKGROUND
admit dx ulcerative chronic pancolitis with other complication & dehydration  pt on IV fluids hydration and Iv abx
26 y/o admitted for UC

## 2020-07-27 LAB
ANION GAP SERPL CALC-SCNC: 14 MMOL/L — SIGNIFICANT CHANGE UP (ref 5–17)
BUN SERPL-MCNC: 11 MG/DL — SIGNIFICANT CHANGE UP (ref 7–23)
CALCIUM SERPL-MCNC: 8.7 MG/DL — SIGNIFICANT CHANGE UP (ref 8.4–10.5)
CHLORIDE SERPL-SCNC: 105 MMOL/L — SIGNIFICANT CHANGE UP (ref 96–108)
CO2 SERPL-SCNC: 22 MMOL/L — SIGNIFICANT CHANGE UP (ref 22–31)
CREAT SERPL-MCNC: 0.75 MG/DL — SIGNIFICANT CHANGE UP (ref 0.5–1.3)
GLUCOSE SERPL-MCNC: 199 MG/DL — HIGH (ref 70–99)
HAV IGM SER-ACNC: SIGNIFICANT CHANGE UP
HBV CORE IGM SER-ACNC: SIGNIFICANT CHANGE UP
HBV SURFACE AG SER-ACNC: SIGNIFICANT CHANGE UP
HCT VFR BLD CALC: 32 % — LOW (ref 34.5–45)
HCV AB S/CO SERPL IA: 0.09 S/CO — SIGNIFICANT CHANGE UP (ref 0–0.99)
HCV AB SERPL-IMP: SIGNIFICANT CHANGE UP
HGB BLD-MCNC: 10.3 G/DL — LOW (ref 11.5–15.5)
MCHC RBC-ENTMCNC: 29.7 PG — SIGNIFICANT CHANGE UP (ref 27–34)
MCHC RBC-ENTMCNC: 32.2 GM/DL — SIGNIFICANT CHANGE UP (ref 32–36)
MCV RBC AUTO: 92.2 FL — SIGNIFICANT CHANGE UP (ref 80–100)
NRBC # BLD: 0 /100 WBCS — SIGNIFICANT CHANGE UP (ref 0–0)
PLATELET # BLD AUTO: 615 K/UL — HIGH (ref 150–400)
POTASSIUM SERPL-MCNC: 4.1 MMOL/L — SIGNIFICANT CHANGE UP (ref 3.5–5.3)
POTASSIUM SERPL-SCNC: 4.1 MMOL/L — SIGNIFICANT CHANGE UP (ref 3.5–5.3)
RBC # BLD: 3.47 M/UL — LOW (ref 3.8–5.2)
RBC # FLD: 13 % — SIGNIFICANT CHANGE UP (ref 10.3–14.5)
SODIUM SERPL-SCNC: 141 MMOL/L — SIGNIFICANT CHANGE UP (ref 135–145)
WBC # BLD: 14.41 K/UL — HIGH (ref 3.8–10.5)
WBC # FLD AUTO: 14.41 K/UL — HIGH (ref 3.8–10.5)

## 2020-07-27 RX ORDER — ONDANSETRON 8 MG/1
4 TABLET, FILM COATED ORAL ONCE
Refills: 0 | Status: COMPLETED | OUTPATIENT
Start: 2020-07-27 | End: 2020-07-27

## 2020-07-27 RX ADMIN — Medication 40 MILLIGRAM(S): at 23:25

## 2020-07-27 RX ADMIN — Medication 40 MILLIGRAM(S): at 13:51

## 2020-07-27 RX ADMIN — Medication 200 MILLIGRAM(S): at 17:03

## 2020-07-27 RX ADMIN — MORPHINE SULFATE 2 MILLIGRAM(S): 50 CAPSULE, EXTENDED RELEASE ORAL at 13:51

## 2020-07-27 RX ADMIN — Medication 40 MILLIGRAM(S): at 05:53

## 2020-07-27 RX ADMIN — MORPHINE SULFATE 2 MILLIGRAM(S): 50 CAPSULE, EXTENDED RELEASE ORAL at 05:47

## 2020-07-27 RX ADMIN — MORPHINE SULFATE 2 MILLIGRAM(S): 50 CAPSULE, EXTENDED RELEASE ORAL at 14:49

## 2020-07-27 RX ADMIN — Medication 200 MILLIGRAM(S): at 05:52

## 2020-07-27 RX ADMIN — MORPHINE SULFATE 2 MILLIGRAM(S): 50 CAPSULE, EXTENDED RELEASE ORAL at 06:38

## 2020-07-27 RX ADMIN — SODIUM CHLORIDE 75 MILLILITER(S): 9 INJECTION INTRAMUSCULAR; INTRAVENOUS; SUBCUTANEOUS at 06:35

## 2020-07-27 RX ADMIN — ONDANSETRON 4 MILLIGRAM(S): 8 TABLET, FILM COATED ORAL at 01:52

## 2020-07-27 NOTE — PROVIDER CONTACT NOTE (OTHER) - ACTION/TREATMENT ORDERED:
VENANCIO Landaverde to order zofran PO per pt request. LY Schmitz in RRT at this time.
provider notified. order to follow.
Provider notified. pt education provided. no new orders.
as per np order for tyneol pending, warm packs applied pt positioned for comfort

## 2020-07-27 NOTE — PROGRESS NOTE ADULT - SUBJECTIVE AND OBJECTIVE BOX
Patient is a 25y old  Female who presents with a chief complaint of colitis (27 Jul 2020 08:15)      SUBJECTIVE / OVERNIGHT EVENTS: still with multiple loose BM's with blood.  ut overall feels better with good apetite    MEDICATIONS  (STANDING):  ciprofloxacin   IVPB 400 milliGRAM(s) IV Intermittent every 12 hours  methylPREDNISolone sodium succinate Injectable 40 milliGRAM(s) IV Push every 8 hours  metroNIDAZOLE  IVPB      metroNIDAZOLE  IVPB 500 milliGRAM(s) IV Intermittent every 8 hours  sodium chloride 0.9%. 1000 milliLiter(s) (75 mL/Hr) IV Continuous <Continuous>    MEDICATIONS  (PRN):  morphine  - Injectable 2 milliGRAM(s) IV Push every 4 hours PRN Severe Pain (7 - 10)      Vital Signs Last 24 Hrs  T(C): 36.8 (27 Jul 2020 11:18), Max: 36.8 (27 Jul 2020 11:18)  T(F): 98.2 (27 Jul 2020 11:18), Max: 98.2 (27 Jul 2020 11:18)  HR: 73 (27 Jul 2020 11:18) (59 - 73)  BP: 91/60 (27 Jul 2020 11:19) (91/60 - 108/73)  BP(mean): --  RR: 18 (27 Jul 2020 11:18) (18 - 18)  SpO2: 99% (27 Jul 2020 11:18) (99% - 100%)  CAPILLARY BLOOD GLUCOSE        I&O's Summary    26 Jul 2020 07:01  -  27 Jul 2020 07:00  --------------------------------------------------------  IN: 3365 mL / OUT: 2 mL / NET: 3363 mL    27 Jul 2020 07:01  -  27 Jul 2020 13:10  --------------------------------------------------------  IN: 240 mL / OUT: 0 mL / NET: 240 mL        PHYSICAL EXAM:  GENERAL: NAD, well-developed  HEAD:  Atraumatic, Normocephalic  EYES: EOMI, PERRLA, conjunctiva and sclera clear  NECK: Supple, No JVD  CHEST/LUNG: Clear to auscultation bilaterally; No wheeze  HEART: Regular rate and rhythm; No murmurs, rubs, or gallops  ABDOMEN: Soft, Nontender, Nondistended; Bowel sounds present  EXTREMITIES:  2+ Peripheral Pulses, No clubbing, cyanosis, or edema  PSYCH: AAOx3  NEUROLOGY: non-focal  SKIN: No rashes or lesions    LABS:                        10.3   14.41 )-----------( 615      ( 27 Jul 2020 11:59 )             32.0     07-27    141  |  105  |  11  ----------------------------<  199<H>  4.1   |  22  |  0.75    Ca    8.7      27 Jul 2020 11:59                RADIOLOGY & ADDITIONAL TESTS:    Imaging Personally Reviewed:    Consultant(s) Notes Reviewed:      Care Discussed with Consultants/Other Providers:

## 2020-07-27 NOTE — PROGRESS NOTE ADULT - SUBJECTIVE AND OBJECTIVE BOX
BARRY MCKEON:[id number],   25yFemale followed for:  No Known Allergies    PAST MEDICAL & SURGICAL HISTORY:    FAMILY HISTORY:    MEDICATIONS  (STANDING):  ciprofloxacin   IVPB 400 milliGRAM(s) IV Intermittent every 12 hours  methylPREDNISolone sodium succinate Injectable 40 milliGRAM(s) IV Push every 8 hours  metroNIDAZOLE  IVPB      metroNIDAZOLE  IVPB 500 milliGRAM(s) IV Intermittent every 8 hours  sodium chloride 0.9%. 1000 milliLiter(s) (75 mL/Hr) IV Continuous <Continuous>    MEDICATIONS  (PRN):  morphine  - Injectable 2 milliGRAM(s) IV Push every 4 hours PRN Severe Pain (7 - 10)      Vital Signs Last 24 Hrs  T(C): 36.3 (27 Jul 2020 05:44), Max: 36.6 (26 Jul 2020 22:03)  T(F): 97.3 (27 Jul 2020 05:44), Max: 97.8 (26 Jul 2020 22:03)  HR: 62 (27 Jul 2020 05:44) (59 - 67)  BP: 108/73 (27 Jul 2020 05:44) (100/65 - 108/73)  BP(mean): --  RR: 18 (27 Jul 2020 05:44) (18 - 18)  SpO2: 100% (27 Jul 2020 05:44) (99% - 100%)  nc/at  s1s2  cta  soft, nt, nd no guarding or rebound  no c/c/e    CBC Full  -  ( 26 Jul 2020 06:44 )  WBC Count : 10.61 K/uL  RBC Count : 3.64 M/uL  Hemoglobin : 10.5 g/dL  Hematocrit : 33.0 %  Platelet Count - Automated : 623 K/uL  Mean Cell Volume : 90.7 fl  Mean Cell Hemoglobin : 28.8 pg  Mean Cell Hemoglobin Concentration : 31.8 gm/dL  Auto Neutrophil # : x  Auto Lymphocyte # : x  Auto Monocyte # : x  Auto Eosinophil # : x  Auto Basophil # : x  Auto Neutrophil % : x  Auto Lymphocyte % : x  Auto Monocyte % : x  Auto Eosinophil % : x  Auto Basophil % : x    07-26    140  |  108  |  10  ----------------------------<  165<H>  4.1   |  21<L>  |  0.69    Ca    8.5      26 Jul 2020 06:44

## 2020-07-27 NOTE — PROGRESS NOTE ADULT - ASSESSMENT
23 yo female with no PMHx p/w diarrhea, abd pain.  Patient reports that she developed fevers and diarrhea 4 months ago.  fevers resolved, but diarrhea has persisted.  A few weeks ago diarrhea became associated with severe abdominal pain.  Went to an OSH and had a CT showing pancolitis.  Patient followed up with GI and had a colonoscopy today.  Colonoscopy showing severe inflammation concerning for UC.  Sent to the ER from GI for IV steroids and further work up.  Denies fevers, CP, SOB, vomiting.    colitis likely ulcerative colitis  - GI following  - iv steroids,  - will consider tapring  - iv abx  - stool studies negative  - low fiber diet  - ambulation encouraged

## 2020-07-28 RX ORDER — ACETAMINOPHEN 500 MG
650 TABLET ORAL EVERY 6 HOURS
Refills: 0 | Status: DISCONTINUED | OUTPATIENT
Start: 2020-07-28 | End: 2020-07-30

## 2020-07-28 RX ORDER — ONDANSETRON 8 MG/1
4 TABLET, FILM COATED ORAL ONCE
Refills: 0 | Status: COMPLETED | OUTPATIENT
Start: 2020-07-28 | End: 2020-07-28

## 2020-07-28 RX ADMIN — SODIUM CHLORIDE 75 MILLILITER(S): 9 INJECTION INTRAMUSCULAR; INTRAVENOUS; SUBCUTANEOUS at 18:23

## 2020-07-28 RX ADMIN — Medication 40 MILLIGRAM(S): at 14:07

## 2020-07-28 RX ADMIN — Medication 650 MILLIGRAM(S): at 10:39

## 2020-07-28 RX ADMIN — Medication 40 MILLIGRAM(S): at 22:44

## 2020-07-28 RX ADMIN — ONDANSETRON 4 MILLIGRAM(S): 8 TABLET, FILM COATED ORAL at 01:27

## 2020-07-28 RX ADMIN — ONDANSETRON 4 MILLIGRAM(S): 8 TABLET, FILM COATED ORAL at 05:33

## 2020-07-28 RX ADMIN — Medication 650 MILLIGRAM(S): at 11:10

## 2020-07-28 RX ADMIN — Medication 40 MILLIGRAM(S): at 05:35

## 2020-07-28 RX ADMIN — SODIUM CHLORIDE 75 MILLILITER(S): 9 INJECTION INTRAMUSCULAR; INTRAVENOUS; SUBCUTANEOUS at 01:27

## 2020-07-28 RX ADMIN — Medication 650 MILLIGRAM(S): at 22:43

## 2020-07-28 RX ADMIN — Medication 200 MILLIGRAM(S): at 05:40

## 2020-07-28 RX ADMIN — Medication 650 MILLIGRAM(S): at 23:05

## 2020-07-28 RX ADMIN — Medication 200 MILLIGRAM(S): at 18:23

## 2020-07-28 NOTE — PROGRESS NOTE ADULT - SUBJECTIVE AND OBJECTIVE BOX
INTERVAL HPI/OVERNIGHT EVENTS:    MEDICATIONS  (STANDING):  ciprofloxacin   IVPB 400 milliGRAM(s) IV Intermittent every 12 hours  methylPREDNISolone sodium succinate Injectable 40 milliGRAM(s) IV Push every 8 hours  metroNIDAZOLE  IVPB      metroNIDAZOLE  IVPB 500 milliGRAM(s) IV Intermittent every 8 hours  sodium chloride 0.9%. 1000 milliLiter(s) (75 mL/Hr) IV Continuous <Continuous>    MEDICATIONS  (PRN):  morphine  - Injectable 2 milliGRAM(s) IV Push every 4 hours PRN Severe Pain (7 - 10)      Allergies    No Known Allergies    Intolerances            PHYSICAL EXAM:   Vital Signs:  Vital Signs Last 24 Hrs  T(C): 36.2 (28 Jul 2020 05:05), Max: 36.8 (27 Jul 2020 11:18)  T(F): 97.2 (28 Jul 2020 05:05), Max: 98.2 (27 Jul 2020 11:18)  HR: 73 (28 Jul 2020 05:05) (73 - 78)  BP: 108/78 (28 Jul 2020 05:05) (91/60 - 108/78)  BP(mean): --  RR: 18 (28 Jul 2020 05:05) (18 - 18)  SpO2: 99% (28 Jul 2020 05:05) (99% - 99%)  Daily     Daily     GENERAL:  no distress  HEENT:  NC/AT,  anicteric  CHEST:   no increased effort, breath sounds clear  HEART:  Regular rhythm  ABDOMEN:  Soft, non-tender, non-distended, normoactive bowel sounds,  no masses ,no hepato-splenomegaly, no signs of chronic liver disease  EXTEREMITIES:  no cyanosis      LABS:                        10.3   14.41 )-----------( 615      ( 27 Jul 2020 11:59 )             32.0     07-27    141  |  105  |  11  ----------------------------<  199<H>  4.1   |  22  |  0.75    Ca    8.7      27 Jul 2020 11:59            RADIOLOGY & ADDITIONAL TESTS:

## 2020-07-28 NOTE — PROGRESS NOTE ADULT - ASSESSMENT
improving, diarrhea more formed, less frequent but still with blood/clots  continue with IV steroids  diet as tolerates  follow labs and CRP

## 2020-07-28 NOTE — PROGRESS NOTE ADULT - ASSESSMENT
25 yo female with no PMHx p/w diarrhea, abd pain.  Patient reports that she developed fevers and diarrhea 4 months ago.  fevers resolved, but diarrhea has persisted.  A few weeks ago diarrhea became associated with severe abdominal pain.  Went to an OSH and had a CT showing pancolitis.  Patient followed up with GI and had a colonoscopy today.  Colonoscopy showing severe inflammation concerning for UC.  Sent to the ER from GI for IV steroids and further work up.  Denies fevers, CP, SOB, vomiting.    colitis likely ulcerative colitis  - GI following  - iv steroids,  - will consider tapering when ok with GI  - iv abx  - stool studies negative  - low fiber diet  - ambulation encouraged

## 2020-07-28 NOTE — PROGRESS NOTE ADULT - SUBJECTIVE AND OBJECTIVE BOX
Patient is a 25y old  Female who presents with a chief complaint of colitis (28 Jul 2020 08:25)      SUBJECTIVE / OVERNIGHT EVENTS:  feeling better.  less frequent Bm's. no more bloody stools    MEDICATIONS  (STANDING):  ciprofloxacin   IVPB 400 milliGRAM(s) IV Intermittent every 12 hours  methylPREDNISolone sodium succinate Injectable 40 milliGRAM(s) IV Push every 8 hours  metroNIDAZOLE  IVPB      metroNIDAZOLE  IVPB 500 milliGRAM(s) IV Intermittent every 8 hours  sodium chloride 0.9%. 1000 milliLiter(s) (75 mL/Hr) IV Continuous <Continuous>    MEDICATIONS  (PRN):  acetaminophen    Suspension .. 650 milliGRAM(s) Oral every 6 hours PRN Mild Pain (1 - 3), Moderate Pain (4 - 6)  morphine  - Injectable 2 milliGRAM(s) IV Push every 4 hours PRN Severe Pain (7 - 10)      Vital Signs Last 24 Hrs  T(C): 36.5 (28 Jul 2020 12:58), Max: 36.8 (27 Jul 2020 20:34)  T(F): 97.7 (28 Jul 2020 12:58), Max: 98.2 (27 Jul 2020 20:34)  HR: 100 (28 Jul 2020 12:58) (73 - 100)  BP: 97/59 (28 Jul 2020 12:58) (97/59 - 108/78)  BP(mean): --  RR: 18 (28 Jul 2020 12:58) (18 - 18)  SpO2: 99% (28 Jul 2020 12:58) (99% - 99%)  CAPILLARY BLOOD GLUCOSE        I&O's Summary    27 Jul 2020 07:01  -  28 Jul 2020 07:00  --------------------------------------------------------  IN: 1920 mL / OUT: 0 mL / NET: 1920 mL    28 Jul 2020 07:01  -  28 Jul 2020 15:02  --------------------------------------------------------  IN: 480 mL / OUT: 0 mL / NET: 480 mL        PHYSICAL EXAM:  GENERAL: NAD, well-developed  HEAD:  Atraumatic, Normocephalic  EYES: EOMI, PERRLA, conjunctiva and sclera clear  NECK: Supple, No JVD  CHEST/LUNG: Clear to auscultation bilaterally; No wheeze  HEART: Regular rate and rhythm; No murmurs, rubs, or gallops  ABDOMEN: Soft, Nontender, Nondistended; Bowel sounds present  EXTREMITIES:  2+ Peripheral Pulses, No clubbing, cyanosis, or edema  PSYCH: AAOx3  NEUROLOGY: non-focal  SKIN: No rashes or lesions    LABS:                        10.3   14.41 )-----------( 615      ( 27 Jul 2020 11:59 )             32.0     07-27    141  |  105  |  11  ----------------------------<  199<H>  4.1   |  22  |  0.75    Ca    8.7      27 Jul 2020 11:59                RADIOLOGY & ADDITIONAL TESTS:    Imaging Personally Reviewed:    Consultant(s) Notes Reviewed:      Care Discussed with Consultants/Other Providers:

## 2020-07-29 LAB
ANION GAP SERPL CALC-SCNC: 14 MMOL/L — SIGNIFICANT CHANGE UP (ref 5–17)
BUN SERPL-MCNC: 15 MG/DL — SIGNIFICANT CHANGE UP (ref 7–23)
CALCIUM SERPL-MCNC: 8.8 MG/DL — SIGNIFICANT CHANGE UP (ref 8.4–10.5)
CHLORIDE SERPL-SCNC: 104 MMOL/L — SIGNIFICANT CHANGE UP (ref 96–108)
CO2 SERPL-SCNC: 22 MMOL/L — SIGNIFICANT CHANGE UP (ref 22–31)
CREAT SERPL-MCNC: 0.65 MG/DL — SIGNIFICANT CHANGE UP (ref 0.5–1.3)
GLUCOSE SERPL-MCNC: 170 MG/DL — HIGH (ref 70–99)
HCT VFR BLD CALC: 36 % — SIGNIFICANT CHANGE UP (ref 34.5–45)
HGB BLD-MCNC: 11.2 G/DL — LOW (ref 11.5–15.5)
MCHC RBC-ENTMCNC: 28.7 PG — SIGNIFICANT CHANGE UP (ref 27–34)
MCHC RBC-ENTMCNC: 31.1 GM/DL — LOW (ref 32–36)
MCV RBC AUTO: 92.3 FL — SIGNIFICANT CHANGE UP (ref 80–100)
NRBC # BLD: 0 /100 WBCS — SIGNIFICANT CHANGE UP (ref 0–0)
PLATELET # BLD AUTO: 686 K/UL — HIGH (ref 150–400)
POTASSIUM SERPL-MCNC: 4.2 MMOL/L — SIGNIFICANT CHANGE UP (ref 3.5–5.3)
POTASSIUM SERPL-SCNC: 4.2 MMOL/L — SIGNIFICANT CHANGE UP (ref 3.5–5.3)
RBC # BLD: 3.9 M/UL — SIGNIFICANT CHANGE UP (ref 3.8–5.2)
RBC # FLD: 12.9 % — SIGNIFICANT CHANGE UP (ref 10.3–14.5)
SODIUM SERPL-SCNC: 140 MMOL/L — SIGNIFICANT CHANGE UP (ref 135–145)
WBC # BLD: 12.25 K/UL — HIGH (ref 3.8–10.5)
WBC # FLD AUTO: 12.25 K/UL — HIGH (ref 3.8–10.5)

## 2020-07-29 RX ORDER — ONDANSETRON 8 MG/1
4 TABLET, FILM COATED ORAL ONCE
Refills: 0 | Status: COMPLETED | OUTPATIENT
Start: 2020-07-29 | End: 2020-07-29

## 2020-07-29 RX ADMIN — SODIUM CHLORIDE 75 MILLILITER(S): 9 INJECTION INTRAMUSCULAR; INTRAVENOUS; SUBCUTANEOUS at 02:49

## 2020-07-29 RX ADMIN — Medication 40 MILLIGRAM(S): at 14:20

## 2020-07-29 RX ADMIN — Medication 200 MILLIGRAM(S): at 05:05

## 2020-07-29 RX ADMIN — ONDANSETRON 4 MILLIGRAM(S): 8 TABLET, FILM COATED ORAL at 06:11

## 2020-07-29 RX ADMIN — Medication 200 MILLIGRAM(S): at 17:42

## 2020-07-29 RX ADMIN — Medication 40 MILLIGRAM(S): at 21:31

## 2020-07-29 RX ADMIN — Medication 40 MILLIGRAM(S): at 05:10

## 2020-07-29 NOTE — PROGRESS NOTE ADULT - ASSESSMENT
doing well on steroids.  d/w pt re: biologic. she would like to discuss with bambi gonzalez.  d/c planning tomororw on oral predinsone

## 2020-07-29 NOTE — PROGRESS NOTE ADULT - ASSESSMENT
25 yo female with no PMHx p/w diarrhea, abd pain.  Patient reports that she developed fevers and diarrhea 4 months ago.  fevers resolved, but diarrhea has persisted.  A few weeks ago diarrhea became associated with severe abdominal pain.  Went to an OSH and had a CT showing pancolitis.  Patient followed up with GI and had a colonoscopy today.  Colonoscopy showing severe inflammation concerning for UC.  Sent to the ER from GI for IV steroids and further work up.  Denies fevers, CP, SOB, vomiting.    colitis likely ulcerative colitis  - GI following  - iv steroids,  - will consider change to po tomorrow per GI  - iv abx  - stool studies negative  - low fiber diet  - ambulation encouraged

## 2020-07-29 NOTE — PROGRESS NOTE ADULT - SUBJECTIVE AND OBJECTIVE BOX
Patient is a 25y old  Female who presents with a chief complaint of colitis (29 Jul 2020 08:17)      SUBJECTIVE / OVERNIGHT EVENTS:  doing better.  good appetite.  more formed BM's.  No bleeding    MEDICATIONS  (STANDING):  ciprofloxacin   IVPB 400 milliGRAM(s) IV Intermittent every 12 hours  methylPREDNISolone sodium succinate Injectable 40 milliGRAM(s) IV Push every 8 hours  metroNIDAZOLE  IVPB      metroNIDAZOLE  IVPB 500 milliGRAM(s) IV Intermittent every 8 hours  sodium chloride 0.9%. 1000 milliLiter(s) (75 mL/Hr) IV Continuous <Continuous>    MEDICATIONS  (PRN):  acetaminophen    Suspension .. 650 milliGRAM(s) Oral every 6 hours PRN Mild Pain (1 - 3), Moderate Pain (4 - 6)  morphine  - Injectable 2 milliGRAM(s) IV Push every 4 hours PRN Severe Pain (7 - 10)      Vital Signs Last 24 Hrs  T(C): 36.5 (29 Jul 2020 05:13), Max: 36.7 (28 Jul 2020 19:48)  T(F): 97.7 (29 Jul 2020 05:13), Max: 98.1 (28 Jul 2020 19:48)  HR: 60 (29 Jul 2020 05:13) (60 - 100)  BP: 105/70 (29 Jul 2020 05:13) (97/59 - 107/73)  BP(mean): --  RR: 18 (29 Jul 2020 05:13) (18 - 18)  SpO2: 98% (29 Jul 2020 05:13) (98% - 99%)  CAPILLARY BLOOD GLUCOSE        I&O's Summary    28 Jul 2020 07:01  -  29 Jul 2020 07:00  --------------------------------------------------------  IN: 1850 mL / OUT: 0 mL / NET: 1850 mL    29 Jul 2020 07:01  -  29 Jul 2020 11:25  --------------------------------------------------------  IN: 240 mL / OUT: 0 mL / NET: 240 mL        PHYSICAL EXAM:  GENERAL: NAD, well-developed  HEAD:  Atraumatic, Normocephalic  EYES: EOMI, PERRLA, conjunctiva and sclera clear  NECK: Supple, No JVD  CHEST/LUNG: Clear to auscultation bilaterally; No wheeze  HEART: Regular rate and rhythm; No murmurs, rubs, or gallops  ABDOMEN: Soft, Nontender, Nondistended; Bowel sounds present  EXTREMITIES:  2+ Peripheral Pulses, No clubbing, cyanosis, or edema  PSYCH: AAOx3  NEUROLOGY: non-focal  SKIN: No rashes or lesions    LABS:                        11.2   12.25 )-----------( 686      ( 29 Jul 2020 09:30 )             36.0     07-29    140  |  104  |  15  ----------------------------<  170<H>  4.2   |  22  |  0.65    Ca    8.8      29 Jul 2020 09:30                RADIOLOGY & ADDITIONAL TESTS:    Imaging Personally Reviewed:    Consultant(s) Notes Reviewed:      Care Discussed with Consultants/Other Providers:

## 2020-07-29 NOTE — PROVIDER CONTACT NOTE (MEDICATION) - ASSESSMENT
Pt is a+o x4, vss. Refused metronidazole due to side effects she experienced after having medication, such as, nausea.

## 2020-07-29 NOTE — PROGRESS NOTE ADULT - SUBJECTIVE AND OBJECTIVE BOX
BARRY MCKEON:[id number],   25yFemale followed for:  No Known Allergies    PAST MEDICAL & SURGICAL HISTORY:    FAMILY HISTORY:    MEDICATIONS  (STANDING):  ciprofloxacin   IVPB 400 milliGRAM(s) IV Intermittent every 12 hours  methylPREDNISolone sodium succinate Injectable 40 milliGRAM(s) IV Push every 8 hours  metroNIDAZOLE  IVPB      metroNIDAZOLE  IVPB 500 milliGRAM(s) IV Intermittent every 8 hours  sodium chloride 0.9%. 1000 milliLiter(s) (75 mL/Hr) IV Continuous <Continuous>    MEDICATIONS  (PRN):  acetaminophen    Suspension .. 650 milliGRAM(s) Oral every 6 hours PRN Mild Pain (1 - 3), Moderate Pain (4 - 6)  morphine  - Injectable 2 milliGRAM(s) IV Push every 4 hours PRN Severe Pain (7 - 10)      Vital Signs Last 24 Hrs  T(C): 36.5 (29 Jul 2020 05:13), Max: 36.7 (28 Jul 2020 19:48)  T(F): 97.7 (29 Jul 2020 05:13), Max: 98.1 (28 Jul 2020 19:48)  HR: 60 (29 Jul 2020 05:13) (60 - 100)  BP: 105/70 (29 Jul 2020 05:13) (97/59 - 107/73)  BP(mean): --  RR: 18 (29 Jul 2020 05:13) (18 - 18)  SpO2: 98% (29 Jul 2020 05:13) (98% - 99%)  nc/at  s1s2  cta  soft, nt, nd no guarding or rebound  no c/c/e    CBC Full  -  ( 27 Jul 2020 11:59 )  WBC Count : 14.41 K/uL  RBC Count : 3.47 M/uL  Hemoglobin : 10.3 g/dL  Hematocrit : 32.0 %  Platelet Count - Automated : 615 K/uL  Mean Cell Volume : 92.2 fl  Mean Cell Hemoglobin : 29.7 pg  Mean Cell Hemoglobin Concentration : 32.2 gm/dL  Auto Neutrophil # : x  Auto Lymphocyte # : x  Auto Monocyte # : x  Auto Eosinophil # : x  Auto Basophil # : x  Auto Neutrophil % : x  Auto Lymphocyte % : x  Auto Monocyte % : x  Auto Eosinophil % : x  Auto Basophil % : x    07-27    141  |  105  |  11  ----------------------------<  199<H>  4.1   |  22  |  0.75    Ca    8.7      27 Jul 2020 11:59

## 2020-07-29 NOTE — PROVIDER CONTACT NOTE (MEDICATION) - SITUATION
Pt refused metronidazole IVPB. Explained to pt risk and benefits of medication and possible side effects. Pt verbalized understanding but refused medication due to extreme nausea after medication. Explained to pt antiemetic medications may administered to lessen the nausea. Pt aware but still refused medication. Provider notified.

## 2020-07-30 VITALS
OXYGEN SATURATION: 98 % | HEART RATE: 97 BPM | DIASTOLIC BLOOD PRESSURE: 81 MMHG | TEMPERATURE: 98 F | RESPIRATION RATE: 18 BRPM | SYSTOLIC BLOOD PRESSURE: 126 MMHG

## 2020-07-30 PROCEDURE — 85652 RBC SED RATE AUTOMATED: CPT

## 2020-07-30 PROCEDURE — 86140 C-REACTIVE PROTEIN: CPT

## 2020-07-30 PROCEDURE — 82746 ASSAY OF FOLIC ACID SERUM: CPT

## 2020-07-30 PROCEDURE — 84100 ASSAY OF PHOSPHORUS: CPT

## 2020-07-30 PROCEDURE — 82330 ASSAY OF CALCIUM: CPT

## 2020-07-30 PROCEDURE — 80048 BASIC METABOLIC PNL TOTAL CA: CPT

## 2020-07-30 PROCEDURE — 87324 CLOSTRIDIUM AG IA: CPT

## 2020-07-30 PROCEDURE — 84443 ASSAY THYROID STIM HORMONE: CPT

## 2020-07-30 PROCEDURE — 93005 ELECTROCARDIOGRAM TRACING: CPT

## 2020-07-30 PROCEDURE — 99285 EMERGENCY DEPT VISIT HI MDM: CPT | Mod: 25

## 2020-07-30 PROCEDURE — 82803 BLOOD GASES ANY COMBINATION: CPT

## 2020-07-30 PROCEDURE — 96374 THER/PROPH/DIAG INJ IV PUSH: CPT

## 2020-07-30 PROCEDURE — 80074 ACUTE HEPATITIS PANEL: CPT

## 2020-07-30 PROCEDURE — 84145 PROCALCITONIN (PCT): CPT

## 2020-07-30 PROCEDURE — 82435 ASSAY OF BLOOD CHLORIDE: CPT

## 2020-07-30 PROCEDURE — 82607 VITAMIN B-12: CPT

## 2020-07-30 PROCEDURE — 85014 HEMATOCRIT: CPT

## 2020-07-30 PROCEDURE — 84132 ASSAY OF SERUM POTASSIUM: CPT

## 2020-07-30 PROCEDURE — 83735 ASSAY OF MAGNESIUM: CPT

## 2020-07-30 PROCEDURE — 80053 COMPREHEN METABOLIC PANEL: CPT

## 2020-07-30 PROCEDURE — 84702 CHORIONIC GONADOTROPIN TEST: CPT

## 2020-07-30 PROCEDURE — 82947 ASSAY GLUCOSE BLOOD QUANT: CPT

## 2020-07-30 PROCEDURE — 86480 TB TEST CELL IMMUN MEASURE: CPT

## 2020-07-30 PROCEDURE — 86769 SARS-COV-2 COVID-19 ANTIBODY: CPT

## 2020-07-30 PROCEDURE — 87507 IADNA-DNA/RNA PROBE TQ 12-25: CPT

## 2020-07-30 PROCEDURE — 84295 ASSAY OF SERUM SODIUM: CPT

## 2020-07-30 PROCEDURE — 83605 ASSAY OF LACTIC ACID: CPT

## 2020-07-30 PROCEDURE — 96375 TX/PRO/DX INJ NEW DRUG ADDON: CPT

## 2020-07-30 PROCEDURE — 87449 NOS EACH ORGANISM AG IA: CPT

## 2020-07-30 PROCEDURE — 85027 COMPLETE CBC AUTOMATED: CPT

## 2020-07-30 RX ORDER — ACETAMINOPHEN 500 MG
2 TABLET ORAL
Qty: 0 | Refills: 0 | DISCHARGE

## 2020-07-30 RX ADMIN — Medication 200 MILLIGRAM(S): at 06:12

## 2020-07-30 RX ADMIN — Medication 40 MILLIGRAM(S): at 06:11

## 2020-07-30 NOTE — DISCHARGE NOTE PROVIDER - CARE PROVIDER_API CALL
Andrew Avitia  GASTROENTEROLOGY  [address] [place] [address]  Phone: [phone]  Fax: [phone]  Follow Up Time:

## 2020-07-30 NOTE — PROGRESS NOTE ADULT - SUBJECTIVE AND OBJECTIVE BOX
Patient is a 25y old  Female who presents with a chief complaint of colitis (30 Jul 2020 07:28)      SUBJECTIVE / OVERNIGHT EVENTS:  c/o constipation    MEDICATIONS  (STANDING):  ciprofloxacin   IVPB 400 milliGRAM(s) IV Intermittent every 12 hours  methylPREDNISolone sodium succinate Injectable 40 milliGRAM(s) IV Push every 8 hours  metroNIDAZOLE  IVPB      metroNIDAZOLE  IVPB 500 milliGRAM(s) IV Intermittent every 8 hours  sodium chloride 0.9%. 1000 milliLiter(s) (75 mL/Hr) IV Continuous <Continuous>    MEDICATIONS  (PRN):  acetaminophen    Suspension .. 650 milliGRAM(s) Oral every 6 hours PRN Mild Pain (1 - 3), Moderate Pain (4 - 6)  morphine  - Injectable 2 milliGRAM(s) IV Push every 4 hours PRN Severe Pain (7 - 10)      Vital Signs Last 24 Hrs  T(C): 36.4 (30 Jul 2020 06:00), Max: 36.7 (29 Jul 2020 11:56)  T(F): 97.5 (30 Jul 2020 06:00), Max: 98 (29 Jul 2020 11:56)  HR: 75 (30 Jul 2020 06:00) (75 - 108)  BP: 106/72 (30 Jul 2020 06:00) (106/72 - 111/74)  BP(mean): --  RR: 18 (30 Jul 2020 06:00) (18 - 18)  SpO2: 98% (30 Jul 2020 06:00) (98% - 99%)  CAPILLARY BLOOD GLUCOSE        I&O's Summary    29 Jul 2020 07:01  -  30 Jul 2020 07:00  --------------------------------------------------------  IN: 2700 mL / OUT: 1 mL / NET: 2699 mL        PHYSICAL EXAM:  GENERAL: NAD, well-developed  HEAD:  Atraumatic, Normocephalic  EYES: EOMI, PERRLA, conjunctiva and sclera clear  NECK: Supple, No JVD  CHEST/LUNG: Clear to auscultation bilaterally; No wheeze  HEART: Regular rate and rhythm; No murmurs, rubs, or gallops  ABDOMEN: Soft, Nontender, Nondistended; Bowel sounds present  EXTREMITIES:  2+ Peripheral Pulses, No clubbing, cyanosis, or edema  PSYCH: AAOx3  NEUROLOGY: non-focal  SKIN: No rashes or lesions    LABS:                        11.2   12.25 )-----------( 686      ( 29 Jul 2020 09:30 )             36.0     07-29    140  |  104  |  15  ----------------------------<  170<H>  4.2   |  22  |  0.65    Ca    8.8      29 Jul 2020 09:30                RADIOLOGY & ADDITIONAL TESTS:    Imaging Personally Reviewed:    Consultant(s) Notes Reviewed:      Care Discussed with Consultants/Other Providers:

## 2020-07-30 NOTE — DISCHARGE NOTE NURSING/CASE MANAGEMENT/SOCIAL WORK - PATIENT PORTAL LINK FT
You can access the FollowMyHealth Patient Portal offered by [facility] by registering at the following website: [email or web address]. By joining [facility]’s FollowMyHealth portal, you will also be able to view your health information using other applications (apps) compatible with our system.

## 2020-07-30 NOTE — DISCHARGE NOTE PROVIDER - NSDCMRMEDTOKEN_GEN_ALL_CORE_FT
predniSONE 10 mg oral tablet: 4 tab(s) orally once a day ( to be tapered by Your GI doctor)  Tylenol 325 mg oral tablet: 2 tab(s) orally every 6 hours, As Needed

## 2020-07-30 NOTE — PROGRESS NOTE ADULT - SUBJECTIVE AND OBJECTIVE BOX
BARRY MCKEON:[id number],   25yFemale followed for:  No Known Allergies    PAST MEDICAL & SURGICAL HISTORY:    FAMILY HISTORY:    MEDICATIONS  (STANDING):  ciprofloxacin   IVPB 400 milliGRAM(s) IV Intermittent every 12 hours  methylPREDNISolone sodium succinate Injectable 40 milliGRAM(s) IV Push every 8 hours  metroNIDAZOLE  IVPB      metroNIDAZOLE  IVPB 500 milliGRAM(s) IV Intermittent every 8 hours  sodium chloride 0.9%. 1000 milliLiter(s) (75 mL/Hr) IV Continuous <Continuous>    MEDICATIONS  (PRN):  acetaminophen    Suspension .. 650 milliGRAM(s) Oral every 6 hours PRN Mild Pain (1 - 3), Moderate Pain (4 - 6)  morphine  - Injectable 2 milliGRAM(s) IV Push every 4 hours PRN Severe Pain (7 - 10)      Vital Signs Last 24 Hrs  T(C): 36.4 (30 Jul 2020 06:00), Max: 36.7 (29 Jul 2020 11:56)  T(F): 97.5 (30 Jul 2020 06:00), Max: 98 (29 Jul 2020 11:56)  HR: 75 (30 Jul 2020 06:00) (75 - 108)  BP: 106/72 (30 Jul 2020 06:00) (106/72 - 111/74)  BP(mean): --  RR: 18 (30 Jul 2020 06:00) (18 - 18)  SpO2: 98% (30 Jul 2020 06:00) (98% - 99%)  nc/at  s1s2  cta  soft, nt, nd no guarding or rebound  no c/c/e    CBC Full  -  ( 29 Jul 2020 09:30 )  WBC Count : 12.25 K/uL  RBC Count : 3.90 M/uL  Hemoglobin : 11.2 g/dL  Hematocrit : 36.0 %  Platelet Count - Automated : 686 K/uL  Mean Cell Volume : 92.3 fl  Mean Cell Hemoglobin : 28.7 pg  Mean Cell Hemoglobin Concentration : 31.1 gm/dL  Auto Neutrophil # : x  Auto Lymphocyte # : x  Auto Monocyte # : x  Auto Eosinophil # : x  Auto Basophil # : x  Auto Neutrophil % : x  Auto Lymphocyte % : x  Auto Monocyte % : x  Auto Eosinophil % : x  Auto Basophil % : x    07-29    140  |  104  |  15  ----------------------------<  170<H>  4.2   |  22  |  0.65    Ca    8.8      29 Jul 2020 09:30

## 2020-07-30 NOTE — PROGRESS NOTE ADULT - ASSESSMENT
severe uc.  pt now with constipation. recommend d/c on predinsone 40 mg.  to be taperd by primary doctor. defer to Dr. Avalos re: plan of care/ biologic. hep profile negative and quanteferon pending r/b/a of steroids including but not limited to avascular necrosis explained

## 2020-07-30 NOTE — DISCHARGE NOTE PROVIDER - HOSPITAL COURSE
23 yo female with no PMHx p/w diarrhea, abd pain.  Patient reports that she developed fevers and diarrhea 4 months ago.  fevers resolved, but diarrhea has persisted.  A few weeks ago diarrhea became associated with severe abdominal pain.  Went to an OSH and had a CT showing pancolitis.  Patient followed up with GI and had a colonoscopy on the day of admission.   Colonoscopy showing severe inflammation concerning for UC.  Sent to the ER from GI for IV steroids and further work up.     s/p GI evaluation. colitis likely ulcerative colitis. s/p IV steroid, stool studies negative    Diarrhea improved. Cleared for discharge by Dr. Ayala    Pt to follow up with PCP and GI    Discharge med discussed with Dr. Ayala. Discharge on Prednisone 40 mg daily tapered by pt's GI, no ABX

## 2020-07-30 NOTE — CHART NOTE - NSCHARTNOTEFT_GEN_A_CORE
Discharge facilitated as requested by Dr. Ayala who medically cleared ptfor discharge.  Discharge med and DCP discussed with Dr. Ayala-->Discharge on Prednisone 40 mg daily tapered by pt's GI, no ABX, to follow up with her GI Dr. Sadi Muniz Np-C  #[id number]

## 2020-07-30 NOTE — DISCHARGE NOTE PROVIDER - NSDCCPCAREPLAN_GEN_ALL_CORE_FT
PRINCIPAL DISCHARGE DIAGNOSIS  Diagnosis: Ulcerative pancolitis with other complication  Assessment and Plan of Treatment: s/p IV steroid  Please continue Prednisone 40 mg po daily for now, to be tapered by your gastroenterologist Dr. Avitia  Please follow up with your GI Dr. Avitia in one week  Please follow up with your primray care physician in one week      SECONDARY DISCHARGE DIAGNOSES  Diagnosis: Dehydration, moderate  Assessment and Plan of Treatment: s/p IV hydration   Now tolorating oral hydration

## 2020-07-30 NOTE — PROGRESS NOTE ADULT - ASSESSMENT
25 yo female with no PMHx p/w diarrhea, abd pain.  Patient reports that she developed fevers and diarrhea 4 months ago.  fevers resolved, but diarrhea has persisted.  A few weeks ago diarrhea became associated with severe abdominal pain.  Went to an OSH and had a CT showing pancolitis.  Patient followed up with GI and had a colonoscopy today.  Colonoscopy showing severe inflammation concerning for UC.  Sent to the ER from GI for IV steroids and further work up.  Denies fevers, CP, SOB, vomiting.    colitis likely ulcerative colitis  - GI following  - iv steroids,  - change to po today per GI  - iv abx  - stool studies negative  - low fiber diet  - ambulation encouraged  - d/c home today

## 2020-07-31 LAB
GAMMA INTERFERON BACKGROUND BLD IA-ACNC: 0 IU/ML — SIGNIFICANT CHANGE UP
M TB IFN-G BLD-IMP: ABNORMAL
M TB IFN-G CD4+ BCKGRND COR BLD-ACNC: 0.01 IU/ML — SIGNIFICANT CHANGE UP
M TB IFN-G CD4+CD8+ BCKGRND COR BLD-ACNC: 0.01 IU/ML — SIGNIFICANT CHANGE UP
QUANT TB PLUS MITOGEN MINUS NIL: 0.03 IU/ML — SIGNIFICANT CHANGE UP

## 2023-05-23 NOTE — PROGRESS NOTE ADULT - REASON FOR ADMISSION
colitis patient is hypertensive, per VS patient refusing meds, being discharged. patient is stating that she will not take coreg, protonix, miralax, and senna at home. educated patient on importance of these meds. patient refusing meds, patient refusing tele